# Patient Record
Sex: FEMALE | Race: WHITE | NOT HISPANIC OR LATINO | Employment: FULL TIME | URBAN - NONMETROPOLITAN AREA
[De-identification: names, ages, dates, MRNs, and addresses within clinical notes are randomized per-mention and may not be internally consistent; named-entity substitution may affect disease eponyms.]

---

## 2017-08-22 ENCOUNTER — HOSPITAL ENCOUNTER (EMERGENCY)
Facility: HOSPITAL | Age: 47
Discharge: HOME | End: 2017-08-22
Attending: EMERGENCY MEDICINE | Admitting: EMERGENCY MEDICINE

## 2017-08-22 ENCOUNTER — APPOINTMENT (OUTPATIENT)
Dept: GENERAL RADIOLOGY | Facility: HOSPITAL | Age: 47
End: 2017-08-22

## 2017-08-22 VITALS
HEIGHT: 67 IN | TEMPERATURE: 98.2 F | BODY MASS INDEX: 31.39 KG/M2 | OXYGEN SATURATION: 95 % | SYSTOLIC BLOOD PRESSURE: 134 MMHG | RESPIRATION RATE: 20 BRPM | HEART RATE: 81 BPM | WEIGHT: 200 LBS | DIASTOLIC BLOOD PRESSURE: 83 MMHG

## 2017-08-22 DIAGNOSIS — R07.9 CHEST PAIN, UNSPECIFIED TYPE: Primary | ICD-10-CM

## 2017-08-22 LAB
ALBUMIN SERPL-MCNC: 4.4 G/DL (ref 3.5–5)
ALBUMIN/GLOB SERPL: 1.5 G/DL (ref 1–2)
ALP SERPL-CCNC: 95 U/L (ref 38–126)
ALT SERPL W P-5'-P-CCNC: 27 U/L (ref 13–69)
ANION GAP SERPL CALCULATED.3IONS-SCNC: 14.8 MMOL/L
AST SERPL-CCNC: 34 U/L (ref 15–46)
BASOPHILS # BLD AUTO: 0.06 10*3/MM3 (ref 0–0.2)
BASOPHILS NFR BLD AUTO: 0.5 % (ref 0–2.5)
BILIRUB SERPL-MCNC: 0.5 MG/DL (ref 0.2–1.3)
BUN BLD-MCNC: 14 MG/DL (ref 7–20)
BUN/CREAT SERPL: 17.5 (ref 7.1–23.5)
CALCIUM SPEC-SCNC: 9 MG/DL (ref 8.4–10.2)
CHLORIDE SERPL-SCNC: 106 MMOL/L (ref 98–107)
CO2 SERPL-SCNC: 24 MMOL/L (ref 26–30)
CREAT BLD-MCNC: 0.8 MG/DL (ref 0.6–1.3)
DEPRECATED RDW RBC AUTO: 43 FL (ref 37–54)
EOSINOPHIL # BLD AUTO: 0.13 10*3/MM3 (ref 0–0.7)
EOSINOPHIL NFR BLD AUTO: 1.1 % (ref 0–7)
ERYTHROCYTE [DISTWIDTH] IN BLOOD BY AUTOMATED COUNT: 13.3 % (ref 11.5–14.5)
GFR SERPL CREATININE-BSD FRML MDRD: 77 ML/MIN/1.73
GLOBULIN UR ELPH-MCNC: 2.9 GM/DL
GLUCOSE BLD-MCNC: 93 MG/DL (ref 74–98)
HCT VFR BLD AUTO: 42.1 % (ref 37–47)
HGB BLD-MCNC: 13.8 G/DL (ref 12–16)
HOLD SPECIMEN: NORMAL
HOLD SPECIMEN: NORMAL
IMM GRANULOCYTES # BLD: 0.07 10*3/MM3 (ref 0–0.06)
IMM GRANULOCYTES NFR BLD: 0.6 % (ref 0–0.6)
LYMPHOCYTES # BLD AUTO: 3.83 10*3/MM3 (ref 0.6–3.4)
LYMPHOCYTES NFR BLD AUTO: 33 % (ref 10–50)
MCH RBC QN AUTO: 28.7 PG (ref 27–31)
MCHC RBC AUTO-ENTMCNC: 32.8 G/DL (ref 30–37)
MCV RBC AUTO: 87.5 FL (ref 81–99)
MONOCYTES # BLD AUTO: 0.7 10*3/MM3 (ref 0–0.9)
MONOCYTES NFR BLD AUTO: 6 % (ref 0–12)
NEUTROPHILS # BLD AUTO: 6.8 10*3/MM3 (ref 2–6.9)
NEUTROPHILS NFR BLD AUTO: 58.8 % (ref 37–80)
NRBC BLD MANUAL-RTO: 0 /100 WBC (ref 0–0)
PLATELET # BLD AUTO: 272 10*3/MM3 (ref 130–400)
PMV BLD AUTO: 10.3 FL (ref 6–12)
POTASSIUM BLD-SCNC: 3.8 MMOL/L (ref 3.5–5.1)
PROT SERPL-MCNC: 7.3 G/DL (ref 6.3–8.2)
RBC # BLD AUTO: 4.81 10*6/MM3 (ref 4.2–5.4)
SODIUM BLD-SCNC: 141 MMOL/L (ref 137–145)
TROPONIN I SERPL-MCNC: 0 NG/ML (ref 0–0.05)
TROPONIN I SERPL-MCNC: <0.012 NG/ML (ref 0–0.03)
WBC NRBC COR # BLD: 11.59 10*3/MM3 (ref 4.8–10.8)
WHOLE BLOOD HOLD SPECIMEN: NORMAL
WHOLE BLOOD HOLD SPECIMEN: NORMAL

## 2017-08-22 PROCEDURE — 80053 COMPREHEN METABOLIC PANEL: CPT

## 2017-08-22 PROCEDURE — 85025 COMPLETE CBC W/AUTO DIFF WBC: CPT

## 2017-08-22 PROCEDURE — 25010000002 KETOROLAC TROMETHAMINE PER 15 MG: Performed by: PHYSICIAN ASSISTANT

## 2017-08-22 PROCEDURE — 84484 ASSAY OF TROPONIN QUANT: CPT

## 2017-08-22 PROCEDURE — 71010 HC CHEST PA OR AP: CPT

## 2017-08-22 PROCEDURE — 96374 THER/PROPH/DIAG INJ IV PUSH: CPT

## 2017-08-22 PROCEDURE — 99284 EMERGENCY DEPT VISIT MOD MDM: CPT

## 2017-08-22 PROCEDURE — 93005 ELECTROCARDIOGRAM TRACING: CPT

## 2017-08-22 RX ORDER — DULOXETIN HYDROCHLORIDE 30 MG/1
30 CAPSULE, DELAYED RELEASE ORAL DAILY
COMMUNITY

## 2017-08-22 RX ORDER — KETOROLAC TROMETHAMINE 30 MG/ML
30 INJECTION, SOLUTION INTRAMUSCULAR; INTRAVENOUS ONCE
Status: COMPLETED | OUTPATIENT
Start: 2017-08-22 | End: 2017-08-22

## 2017-08-22 RX ORDER — SODIUM CHLORIDE 0.9 % (FLUSH) 0.9 %
10 SYRINGE (ML) INJECTION AS NEEDED
Status: DISCONTINUED | OUTPATIENT
Start: 2017-08-22 | End: 2017-08-22 | Stop reason: HOSPADM

## 2017-08-22 RX ORDER — ESTRADIOL 2 MG/1
2 TABLET ORAL DAILY
COMMUNITY

## 2017-08-22 RX ORDER — ROSUVASTATIN CALCIUM 20 MG/1
20 TABLET, COATED ORAL DAILY
COMMUNITY

## 2017-08-22 RX ADMIN — KETOROLAC TROMETHAMINE 30 MG: 30 INJECTION, SOLUTION INTRAMUSCULAR at 14:17

## 2017-08-22 NOTE — ED PROVIDER NOTES
Subjective   HPI Comments: 47-year-old female with intermittent left-sided chest pain for 1 week, she describes the pain as aching sharp pain that radiates down her left arm.  She's had the pain intermittent for 1 week.  She also states that she had this in the past and had a stress test several years ago but she does not know how long.  She was seen by her primary care physician today and sent to the emergency department for chest pain.  She has a history of cholesterol and she is a smoker she is not diabetic.  The pain is nonexertional.  sHe states she's had the pain today since this morning    Patient is a 47 y.o. female presenting with chest pain.   History provided by:  Patient   used: No    Chest Pain   Pain location:  L chest  Pain quality: aching    Pain radiates to:  L arm  Pain severity:  Mild  Onset quality:  Gradual  Duration:  1 week  Timing:  Intermittent  Progression:  Waxing and waning  Chronicity:  Recurrent  Relieved by:  Nothing  Worsened by:  Nothing  Associated symptoms comment:  Left arm pain      Review of Systems   Cardiovascular: Positive for chest pain.   All other systems reviewed and are negative.      Past Medical History:   Diagnosis Date   • Basal cell carcinoma    • Fibromyalgia    • Hyperlipidemia        Allergies   Allergen Reactions   • Penicillins Itching       Past Surgical History:   Procedure Laterality Date   • CHOLECYSTECTOMY     • FRACTURE SURGERY     • HYSTERECTOMY         History reviewed. No pertinent family history.    Social History     Social History   • Marital status: Single     Spouse name: N/A   • Number of children: N/A   • Years of education: N/A     Social History Main Topics   • Smoking status: Current Every Day Smoker     Packs/day: 1.00     Types: Cigarettes   • Smokeless tobacco: None   • Alcohol use No   • Drug use: No   • Sexual activity: Not Asked     Other Topics Concern   • None     Social History Narrative   • None            Objective   Physical Exam   Constitutional: She is oriented to person, place, and time. She appears well-developed and well-nourished.   HENT:   Head: Normocephalic.   Eyes: EOM are normal. Pupils are equal, round, and reactive to light.   Neck: Normal range of motion. Neck supple.   Cardiovascular: Normal rate and regular rhythm.    Pulmonary/Chest: Effort normal and breath sounds normal.   Abdominal: Soft. Bowel sounds are normal.   Musculoskeletal: Normal range of motion.   Neurological: She is alert and oriented to person, place, and time. She has normal reflexes.   Skin: Skin is warm and dry.   Psychiatric: She has a normal mood and affect.   Nursing note and vitals reviewed.      Procedures         ED Course  ED Course   Comment By Time   Discussed with Dr. Naylor, patient will follow-up as an outpatient and will call the office for a visit Peewee Pena Jr., PA-C 08/22 1432                  [facility]    Final diagnoses:   Chest pain, unspecified type            Peewee Pena Jr., PA-C  08/22/17 [id number]

## 2020-01-23 ENCOUNTER — OFFICE VISIT (OUTPATIENT)
Dept: UROLOGY | Facility: CLINIC | Age: 50
End: 2020-01-23

## 2020-01-23 VITALS
SYSTOLIC BLOOD PRESSURE: 142 MMHG | HEART RATE: 89 BPM | BODY MASS INDEX: 31.39 KG/M2 | OXYGEN SATURATION: 98 % | RESPIRATION RATE: 18 BRPM | TEMPERATURE: 98 F | WEIGHT: 200 LBS | HEIGHT: 67 IN | DIASTOLIC BLOOD PRESSURE: 90 MMHG

## 2020-01-23 DIAGNOSIS — N39.46 MIXED STRESS AND URGE URINARY INCONTINENCE: Primary | ICD-10-CM

## 2020-01-23 LAB
BILIRUB BLD-MCNC: NEGATIVE MG/DL
CLARITY, POC: CLEAR
COLOR UR: YELLOW
GLUCOSE UR STRIP-MCNC: NEGATIVE MG/DL
KETONES UR QL: NEGATIVE
LEUKOCYTE EST, POC: NEGATIVE
NITRITE UR-MCNC: NEGATIVE MG/ML
PH UR: 6 [PH] (ref 5–8)
PROT UR STRIP-MCNC: NEGATIVE MG/DL
RBC # UR STRIP: NEGATIVE /UL
SP GR UR: 1.02 (ref 1–1.03)
UROBILINOGEN UR QL: NORMAL

## 2020-01-23 PROCEDURE — 81003 URINALYSIS AUTO W/O SCOPE: CPT | Performed by: UROLOGY

## 2020-01-23 PROCEDURE — 51798 US URINE CAPACITY MEASURE: CPT | Performed by: UROLOGY

## 2020-01-23 PROCEDURE — 99204 OFFICE O/P NEW MOD 45 MIN: CPT | Performed by: UROLOGY

## 2020-01-23 RX ORDER — METOPROLOL SUCCINATE 50 MG/1
TABLET, EXTENDED RELEASE ORAL
COMMUNITY
Start: 2020-01-06

## 2020-01-23 RX ORDER — TOPIRAMATE 25 MG/1
TABLET ORAL
COMMUNITY
Start: 2019-12-27

## 2020-01-23 RX ORDER — TOLTERODINE 4 MG/1
CAPSULE, EXTENDED RELEASE ORAL
COMMUNITY
Start: 2019-12-27

## 2020-01-23 RX ORDER — OMEPRAZOLE 20 MG/1
CAPSULE, DELAYED RELEASE ORAL
COMMUNITY
Start: 2020-01-21

## 2020-01-23 RX ORDER — LORATADINE 10 MG/1
TABLET ORAL
COMMUNITY
Start: 2019-12-27

## 2022-06-16 ENCOUNTER — HOSPITAL ENCOUNTER (EMERGENCY)
Facility: HOSPITAL | Age: 52
Discharge: HOME | End: 2022-06-16
Attending: EMERGENCY MEDICINE | Admitting: EMERGENCY MEDICINE

## 2022-06-16 ENCOUNTER — APPOINTMENT (OUTPATIENT)
Dept: CT IMAGING | Facility: HOSPITAL | Age: 52
End: 2022-06-16

## 2022-06-16 ENCOUNTER — APPOINTMENT (OUTPATIENT)
Dept: GENERAL RADIOLOGY | Facility: HOSPITAL | Age: 52
End: 2022-06-16

## 2022-06-16 VITALS
BODY MASS INDEX: 32.49 KG/M2 | SYSTOLIC BLOOD PRESSURE: 120 MMHG | WEIGHT: 207 LBS | RESPIRATION RATE: 16 BRPM | HEART RATE: 81 BPM | DIASTOLIC BLOOD PRESSURE: 59 MMHG | OXYGEN SATURATION: 97 % | TEMPERATURE: 98.2 F | HEIGHT: 67 IN

## 2022-06-16 DIAGNOSIS — K52.9 ENTEROCOLITIS: Primary | ICD-10-CM

## 2022-06-16 DIAGNOSIS — R11.2 NAUSEA AND VOMITING IN ADULT: ICD-10-CM

## 2022-06-16 DIAGNOSIS — R00.2 PALPITATIONS: ICD-10-CM

## 2022-06-16 LAB
ALBUMIN SERPL-MCNC: 4 G/DL (ref 3.5–5.2)
ALBUMIN/GLOB SERPL: 1.4 G/DL
ALP SERPL-CCNC: 84 U/L (ref 39–117)
ALT SERPL W P-5'-P-CCNC: 8 U/L (ref 1–33)
AMYLASE SERPL-CCNC: 38 U/L (ref 28–100)
ANION GAP SERPL CALCULATED.3IONS-SCNC: 15.8 MMOL/L (ref 5–15)
AST SERPL-CCNC: 23 U/L (ref 1–32)
BASOPHILS # BLD AUTO: 0.04 10*3/MM3 (ref 0–0.2)
BASOPHILS NFR BLD AUTO: 0.4 % (ref 0–1.5)
BILIRUB SERPL-MCNC: 0.4 MG/DL (ref 0–1.2)
BUN SERPL-MCNC: 12 MG/DL (ref 6–20)
BUN/CREAT SERPL: 16 (ref 7–25)
CALCIUM SPEC-SCNC: 8.7 MG/DL (ref 8.6–10.5)
CHLORIDE SERPL-SCNC: 103 MMOL/L (ref 98–107)
CO2 SERPL-SCNC: 19.2 MMOL/L (ref 22–29)
CREAT SERPL-MCNC: 0.75 MG/DL (ref 0.57–1)
DEPRECATED RDW RBC AUTO: 40.6 FL (ref 37–54)
EGFRCR SERPLBLD CKD-EPI 2021: 96.5 ML/MIN/1.73
EOSINOPHIL # BLD AUTO: 0.02 10*3/MM3 (ref 0–0.4)
EOSINOPHIL NFR BLD AUTO: 0.2 % (ref 0.3–6.2)
ERYTHROCYTE [DISTWIDTH] IN BLOOD BY AUTOMATED COUNT: 12.8 % (ref 12.3–15.4)
GLOBULIN UR ELPH-MCNC: 2.8 GM/DL
GLUCOSE SERPL-MCNC: 99 MG/DL (ref 65–99)
HCT VFR BLD AUTO: 43.4 % (ref 34–46.6)
HGB BLD-MCNC: 14.5 G/DL (ref 12–15.9)
HOLD SPECIMEN: NORMAL
HOLD SPECIMEN: NORMAL
IMM GRANULOCYTES # BLD AUTO: 0.04 10*3/MM3 (ref 0–0.05)
IMM GRANULOCYTES NFR BLD AUTO: 0.4 % (ref 0–0.5)
LIPASE SERPL-CCNC: 26 U/L (ref 13–60)
LYMPHOCYTES # BLD AUTO: 1.3 10*3/MM3 (ref 0.7–3.1)
LYMPHOCYTES NFR BLD AUTO: 14.4 % (ref 19.6–45.3)
MCH RBC QN AUTO: 29.2 PG (ref 26.6–33)
MCHC RBC AUTO-ENTMCNC: 33.4 G/DL (ref 31.5–35.7)
MCV RBC AUTO: 87.5 FL (ref 79–97)
MONOCYTES # BLD AUTO: 0.58 10*3/MM3 (ref 0.1–0.9)
MONOCYTES NFR BLD AUTO: 6.4 % (ref 5–12)
NEUTROPHILS NFR BLD AUTO: 7.04 10*3/MM3 (ref 1.7–7)
NEUTROPHILS NFR BLD AUTO: 78.2 % (ref 42.7–76)
NRBC BLD AUTO-RTO: 0 /100 WBC (ref 0–0.2)
PLATELET # BLD AUTO: 233 10*3/MM3 (ref 140–450)
PMV BLD AUTO: 10.3 FL (ref 6–12)
POTASSIUM SERPL-SCNC: 3.7 MMOL/L (ref 3.5–5.2)
PROT SERPL-MCNC: 6.8 G/DL (ref 6–8.5)
RBC # BLD AUTO: 4.96 10*6/MM3 (ref 3.77–5.28)
SODIUM SERPL-SCNC: 138 MMOL/L (ref 136–145)
WBC NRBC COR # BLD: 9.02 10*3/MM3 (ref 3.4–10.8)
WHOLE BLOOD HOLD COAG: NORMAL
WHOLE BLOOD HOLD SPECIMEN: NORMAL

## 2022-06-16 PROCEDURE — 85025 COMPLETE CBC W/AUTO DIFF WBC: CPT | Performed by: EMERGENCY MEDICINE

## 2022-06-16 PROCEDURE — 80053 COMPREHEN METABOLIC PANEL: CPT | Performed by: EMERGENCY MEDICINE

## 2022-06-16 PROCEDURE — 83690 ASSAY OF LIPASE: CPT | Performed by: EMERGENCY MEDICINE

## 2022-06-16 PROCEDURE — 93005 ELECTROCARDIOGRAM TRACING: CPT | Performed by: EMERGENCY MEDICINE

## 2022-06-16 PROCEDURE — 82150 ASSAY OF AMYLASE: CPT | Performed by: EMERGENCY MEDICINE

## 2022-06-16 PROCEDURE — 71045 X-RAY EXAM CHEST 1 VIEW: CPT

## 2022-06-16 PROCEDURE — 99283 EMERGENCY DEPT VISIT LOW MDM: CPT

## 2022-06-16 PROCEDURE — 25010000002 IOPAMIDOL 61 % SOLUTION: Performed by: EMERGENCY MEDICINE

## 2022-06-16 PROCEDURE — 74177 CT ABD & PELVIS W/CONTRAST: CPT

## 2022-06-16 RX ORDER — DICYCLOMINE HCL 20 MG
20 TABLET ORAL EVERY 6 HOURS PRN
Qty: 20 TABLET | Refills: 0 | Status: SHIPPED | OUTPATIENT
Start: 2022-06-16

## 2022-06-16 RX ORDER — SODIUM CHLORIDE 0.9 % (FLUSH) 0.9 %
10 SYRINGE (ML) INJECTION AS NEEDED
Status: DISCONTINUED | OUTPATIENT
Start: 2022-06-16 | End: 2022-06-16 | Stop reason: HOSPADM

## 2022-06-16 RX ORDER — ONDANSETRON 4 MG/1
4 TABLET, ORALLY DISINTEGRATING ORAL EVERY 6 HOURS PRN
Qty: 12 TABLET | Refills: 0 | Status: SHIPPED | OUTPATIENT
Start: 2022-06-16

## 2022-06-16 RX ADMIN — IOPAMIDOL 100 ML: 612 INJECTION, SOLUTION INTRAVENOUS at 14:18

## 2022-06-16 NOTE — ED PROVIDER NOTES
Subjective   This is a 52-year-old female who presents to the emergency department chief complaint nausea, vomiting for the past 2 days.  Patient has had lower abdominal and back pain.  Patient describes cramping, stabbing pain.  Patient has had intermittent palpitations.  Patient states she is had too numerous to count bilious emesis.  Patient denies any other associated complaints.      History provided by:  Patient   used: No    Vomiting  The primary symptoms include abdominal pain, nausea and vomiting. Primary symptoms do not include dysuria. The illness began today. The onset was sudden.   The illness is also significant for chills. The illness does not include odynophagia, bloating, constipation, tenesmus or back pain. Associated medical issues do not include GERD, gallstones, liver disease, gastric bypass, bowel resection, irritable bowel syndrome or hemorrhoids. Risk factors for a gastrointestinal illness include travel to endemic areas.       Review of Systems   Constitutional: Positive for chills.   HENT: Negative.  Negative for ear pain, facial swelling, mouth sores and nosebleeds.    Eyes: Negative.  Negative for pain, discharge, redness and itching.   Respiratory: Negative.  Negative for cough, choking, chest tightness and shortness of breath.    Cardiovascular: Negative.  Negative for chest pain and leg swelling.   Gastrointestinal: Positive for abdominal distention, abdominal pain, nausea and vomiting. Negative for bloating and constipation.   Endocrine: Negative.  Negative for heat intolerance, polydipsia and polyphagia.   Genitourinary: Negative.  Negative for dysuria, enuresis, flank pain, frequency, genital sores, hematuria, menstrual problem and pelvic pain.   Musculoskeletal: Negative.  Negative for back pain, gait problem and joint swelling.   Skin: Negative.  Negative for color change, pallor and wound.   Neurological: Negative.  Negative for seizures, speech difficulty,  light-headedness and headaches.   Psychiatric/Behavioral: Negative.  Negative for agitation, behavioral problems, confusion, decreased concentration, dysphoric mood and hallucinations.   All other systems reviewed and are negative.      Past Medical History:   Diagnosis Date   • Basal cell carcinoma    • Fibromyalgia    • Hyperlipidemia        Allergies   Allergen Reactions   • Penicillins Itching       Past Surgical History:   Procedure Laterality Date   • CHOLECYSTECTOMY     • FRACTURE SURGERY     • HYSTERECTOMY         Family History   Problem Relation Age of Onset   • Bleeding Disorder Father    • Hypertension Father    • Arthritis Mother    • Kidney disease Brother        Social History     Socioeconomic History   • Marital status: Single   Tobacco Use   • Smoking status: Current Every Day Smoker     Packs/day: 1.00     Types: Cigarettes   • Smokeless tobacco: Never Used   Substance and Sexual Activity   • Alcohol use: No   • Drug use: No           Objective   Physical Exam  Vitals and nursing note reviewed.   Constitutional:       General: She is not in acute distress.     Appearance: Normal appearance. She is well-developed and normal weight. She is not ill-appearing or toxic-appearing.   HENT:      Head: Normocephalic and atraumatic.      Right Ear: Tympanic membrane, ear canal and external ear normal. There is no impacted cerumen.      Left Ear: Tympanic membrane, ear canal and external ear normal. There is impacted cerumen.      Nose: Nose normal. No congestion or rhinorrhea.      Mouth/Throat:      Mouth: Mucous membranes are moist.      Pharynx: Oropharynx is clear. No oropharyngeal exudate or posterior oropharyngeal erythema.   Eyes:      General: No scleral icterus.        Right eye: No discharge.         Left eye: No discharge.      Conjunctiva/sclera: Conjunctivae normal.      Pupils: Pupils are equal, round, and reactive to light.   Cardiovascular:      Rate and Rhythm: Normal rate and regular  rhythm.      Pulses: Normal pulses.      Heart sounds: Normal heart sounds. No murmur heard.    No friction rub. No gallop.   Pulmonary:      Effort: Pulmonary effort is normal. No respiratory distress.      Breath sounds: Normal breath sounds. No stridor. No wheezing, rhonchi or rales.   Chest:      Chest wall: No tenderness.   Abdominal:      General: Bowel sounds are normal.      Palpations: Abdomen is soft.      Tenderness: There is generalized abdominal tenderness. There is no left CVA tenderness or rebound.      Hernia: No hernia is present.   Musculoskeletal:         General: No swelling, tenderness, deformity or signs of injury. Normal range of motion.      Cervical back: Normal range of motion and neck supple. No rigidity or tenderness.      Right lower leg: No edema.      Left lower leg: No edema.   Lymphadenopathy:      Cervical: No cervical adenopathy.   Skin:     General: Skin is warm and dry.      Capillary Refill: Capillary refill takes less than 2 seconds.      Coloration: Skin is not jaundiced or pale.      Findings: No bruising, erythema, lesion or rash.   Neurological:      General: No focal deficit present.      Mental Status: She is alert and oriented to person, place, and time.      Cranial Nerves: No cranial nerve deficit.      Sensory: No sensory deficit.      Motor: No weakness.      Coordination: Coordination normal.      Gait: Gait normal.      Deep Tendon Reflexes: Reflexes normal.   Psychiatric:         Behavior: Behavior normal.         Thought Content: Thought content normal.         Judgment: Judgment normal.         Procedures           ED Course  ED Course as of 06/16/22 1605   Thu Jun 16, 2022   1150 EKG interpreted by me: Sinus rhythm, normal rate, some nonspecific ST/T changes, this is an abnormal EKG [MP]   1353 IMPRESSION:  No acute cardiopulmonary findings.     Images personally reviewed, interpreted and dictated by FRIEDA Scott.    []   3577 CT Abdomen Pelvis With  Contrast []   1559    IMPRESSION:  No definite acute findings in the abdomen or pelvis to account for the  patient's symptoms.      Fluid-filled nondistended small and large bowel loops without evidence  of obstruction, correlate for possible enterocolitis.     Mild hepatic steatosis. Prior cholecystectomy. Normal appendix.     Images personally reviewed, interpreted and dictated by FRIEDA Scott. []      ED Course User Index  [] Alfonzo Leonard PA-C  [MP] Pj Neff MD                                                 [facility]    Final diagnoses:   Enterocolitis   Nausea and vomiting in adult   Palpitations       ED Disposition  ED Disposition     ED Disposition   Discharge    Condition   Stable    Comment   --             Latrice Tripp APRN  [address]  [place] [id number] [phone]    Call in 1 day           Medication List      New Prescriptions    dicyclomine 20 MG tablet  Commonly known as: BENTYL  Take 1 tablet by mouth Every 6 (Six) Hours As Needed (abdominal pain).     ondansetron ODT 4 MG disintegrating tablet  Commonly known as: ZOFRAN-ODT  Place 1 tablet on the tongue Every 6 (Six) Hours As Needed for Nausea or Vomiting.           Where to Get Your Medications      These medications were sent to [id number] - [address] AT [facility] - [phone]  - [phone] [address] [address]    Phone: [phone]   · dicyclomine 20 MG tablet  · ondansetron ODT 4 MG disintegrating tablet          Alfonzo Leonrad PA-C  06/16/22 3960

## 2022-09-24 ENCOUNTER — TELEPHONE (OUTPATIENT)
Dept: SURGERY | Facility: CLINIC | Age: 52
End: 2022-09-24

## 2022-10-03 RX ORDER — BISACODYL 5 MG
TABLET, DELAYED RELEASE (ENTERIC COATED) ORAL
Qty: 4 TABLET | Refills: 1 | Status: SHIPPED | OUTPATIENT
Start: 2022-10-03 | End: 2022-11-04 | Stop reason: HOSPADM

## 2022-10-03 RX ORDER — POLYETHYLENE GLYCOL 3350 17 G/17G
238 POWDER, FOR SOLUTION ORAL ONCE
Qty: 14 PACKET | Refills: 0 | Status: SHIPPED | OUTPATIENT
Start: 2022-10-03 | End: 2022-10-03

## 2022-10-03 NOTE — TELEPHONE ENCOUNTER
PRESCREENING FOR OPEN ACCESS SCHEDULING    Cecelia Rouse, 1970  [id number]    10/03/22    If, the patient answers yes to any of the following questions the provider will be informed prior to scheduling open access for approval and documented in the chart.    []  Yes  [x] No    1. Have you ever had a colonoscopy in the past?      When:        Where:       Polyps or other:     []  Yes  [x] No    2. Family history of colon cancer?      Relation:       Age of onset:       Do you currently have any of the following?    []  Yes  [x] No  Rectal bleeding, if so, how long?     [x]  Yes  [x] No  Abdominal pain, if so, how long?    [x]  Yes  [x] No  Constipation, if so, how long?    []  Yes  [x] No  Diarrhea, if so, how long?    []  Yes  [x] No  Weight loss, is so, how much?    [] Yes  [x] No  Small caliber stool, if so, how long?      Have you ever had any of the following conditions?    [] Yes  [x] No  Heart attack?      When?       Last cardiac workup?     Blood thinners?    [] Yes  [x] No   Lung problems, asthma or COPD?  [] Yes  [x] No  Oxygen required?       [] Yes  [x] No  Stroke?     [] Yes  [x] No  Have you ever had a reaction to anesthesia?         Patient scheduled with Dr. Becker, 11/4/22.

## 2022-10-17 ENCOUNTER — PREP FOR SURGERY (OUTPATIENT)
Dept: OTHER | Facility: HOSPITAL | Age: 52
End: 2022-10-17

## 2022-10-17 DIAGNOSIS — Z12.11 COLON CANCER SCREENING: Primary | ICD-10-CM

## 2022-10-17 RX ORDER — SODIUM CHLORIDE 0.9 % (FLUSH) 0.9 %
10 SYRINGE (ML) INJECTION AS NEEDED
Status: CANCELLED | OUTPATIENT
Start: 2022-10-17

## 2022-10-17 RX ORDER — SODIUM CHLORIDE, SODIUM LACTATE, POTASSIUM CHLORIDE, CALCIUM CHLORIDE 600; 310; 30; 20 MG/100ML; MG/100ML; MG/100ML; MG/100ML
50 INJECTION, SOLUTION INTRAVENOUS CONTINUOUS
Status: CANCELLED | OUTPATIENT
Start: 2022-10-17

## 2022-10-17 RX ORDER — SODIUM CHLORIDE 0.9 % (FLUSH) 0.9 %
10 SYRINGE (ML) INJECTION EVERY 12 HOURS SCHEDULED
Status: CANCELLED | OUTPATIENT
Start: 2022-10-17

## 2022-10-18 PROBLEM — Z12.11 COLON CANCER SCREENING: Status: ACTIVE | Noted: 2022-10-18

## 2022-11-01 ENCOUNTER — TELEPHONE (OUTPATIENT)
Dept: SURGERY | Facility: CLINIC | Age: 52
End: 2022-11-01

## 2022-11-03 RX ORDER — ASPIRIN 81 MG/1
81 TABLET, CHEWABLE ORAL DAILY
COMMUNITY

## 2022-11-04 ENCOUNTER — HOSPITAL ENCOUNTER (OUTPATIENT)
Facility: HOSPITAL | Age: 52
Setting detail: HOSPITAL OUTPATIENT SURGERY
Discharge: HOME | End: 2022-11-04
Attending: SURGERY | Admitting: SURGERY
Payer: COMMERCIAL

## 2022-11-04 ENCOUNTER — ANESTHESIA EVENT (OUTPATIENT)
Dept: GASTROENTEROLOGY | Facility: HOSPITAL | Age: 52
End: 2022-11-04
Payer: COMMERCIAL

## 2022-11-04 ENCOUNTER — ANESTHESIA (OUTPATIENT)
Dept: GASTROENTEROLOGY | Facility: HOSPITAL | Age: 52
End: 2022-11-04
Payer: COMMERCIAL

## 2022-11-04 VITALS
RESPIRATION RATE: 16 BRPM | BODY MASS INDEX: 32.65 KG/M2 | TEMPERATURE: 97.6 F | OXYGEN SATURATION: 94 % | HEIGHT: 67 IN | WEIGHT: 208 LBS | DIASTOLIC BLOOD PRESSURE: 82 MMHG | HEART RATE: 82 BPM | SYSTOLIC BLOOD PRESSURE: 134 MMHG

## 2022-11-04 DIAGNOSIS — Z12.11 COLON CANCER SCREENING: ICD-10-CM

## 2022-11-04 LAB — GLUCOSE BLDC GLUCOMTR-MCNC: 105 MG/DL (ref 70–130)

## 2022-11-04 PROCEDURE — 82962 GLUCOSE BLOOD TEST: CPT

## 2022-11-04 PROCEDURE — 25010000002 PROPOFOL 200 MG/20ML EMULSION: Performed by: NURSE ANESTHETIST, CERTIFIED REGISTERED

## 2022-11-04 PROCEDURE — 25010000002 ONDANSETRON PER 1 MG: Performed by: NURSE ANESTHETIST, CERTIFIED REGISTERED

## 2022-11-04 PROCEDURE — S0260 H&P FOR SURGERY: HCPCS | Performed by: SURGERY

## 2022-11-04 PROCEDURE — 45378 DIAGNOSTIC COLONOSCOPY: CPT | Performed by: SURGERY

## 2022-11-04 RX ORDER — SODIUM CHLORIDE 0.9 % (FLUSH) 0.9 %
10 SYRINGE (ML) INJECTION AS NEEDED
Status: DISCONTINUED | OUTPATIENT
Start: 2022-11-04 | End: 2022-11-04 | Stop reason: HOSPADM

## 2022-11-04 RX ORDER — ONDANSETRON 2 MG/ML
INJECTION INTRAMUSCULAR; INTRAVENOUS AS NEEDED
Status: DISCONTINUED | OUTPATIENT
Start: 2022-11-04 | End: 2022-11-04 | Stop reason: SURG

## 2022-11-04 RX ORDER — SODIUM CHLORIDE, SODIUM LACTATE, POTASSIUM CHLORIDE, CALCIUM CHLORIDE 600; 310; 30; 20 MG/100ML; MG/100ML; MG/100ML; MG/100ML
50 INJECTION, SOLUTION INTRAVENOUS CONTINUOUS
Status: DISCONTINUED | OUTPATIENT
Start: 2022-11-04 | End: 2022-11-04 | Stop reason: HOSPADM

## 2022-11-04 RX ORDER — SODIUM CHLORIDE 0.9 % (FLUSH) 0.9 %
10 SYRINGE (ML) INJECTION EVERY 12 HOURS SCHEDULED
Status: DISCONTINUED | OUTPATIENT
Start: 2022-11-04 | End: 2022-11-04 | Stop reason: HOSPADM

## 2022-11-04 RX ORDER — PROPOFOL 10 MG/ML
INJECTION, EMULSION INTRAVENOUS AS NEEDED
Status: DISCONTINUED | OUTPATIENT
Start: 2022-11-04 | End: 2022-11-04 | Stop reason: SURG

## 2022-11-04 RX ORDER — LIDOCAINE HYDROCHLORIDE 20 MG/ML
INJECTION, SOLUTION INTRAVENOUS AS NEEDED
Status: DISCONTINUED | OUTPATIENT
Start: 2022-11-04 | End: 2022-11-04 | Stop reason: SURG

## 2022-11-04 RX ADMIN — PROPOFOL 50 MG: 10 INJECTION, EMULSION INTRAVENOUS at 09:42

## 2022-11-04 RX ADMIN — LIDOCAINE HYDROCHLORIDE 50 MG: 20 INJECTION, SOLUTION INTRAVENOUS at 09:29

## 2022-11-04 RX ADMIN — PROPOFOL 100 MG: 10 INJECTION, EMULSION INTRAVENOUS at 09:29

## 2022-11-04 RX ADMIN — PROPOFOL 100 MG: 10 INJECTION, EMULSION INTRAVENOUS at 09:35

## 2022-11-04 RX ADMIN — SODIUM CHLORIDE, POTASSIUM CHLORIDE, SODIUM LACTATE AND CALCIUM CHLORIDE 50 ML/HR: 600; 310; 30; 20 INJECTION, SOLUTION INTRAVENOUS at 08:37

## 2022-11-04 RX ADMIN — GLYCOPYRROLATE 0.2 MG: 0.2 INJECTION, SOLUTION INTRAMUSCULAR; INTRAVITREAL at 09:26

## 2022-11-04 RX ADMIN — ONDANSETRON 4 MG: 2 INJECTION INTRAMUSCULAR; INTRAVENOUS at 09:49

## 2022-11-04 NOTE — ANESTHESIA PREPROCEDURE EVALUATION
Anesthesia Evaluation     Patient summary reviewed and Nursing notes reviewed   NPO Solid Status: > 8 hours  NPO Liquid Status: > 8 hours           Airway   Mallampati: II  TM distance: >3 FB  Neck ROM: full  Possible difficult intubation  Dental - normal exam     Pulmonary     breath sounds clear to auscultation  (+) a smoker Current,   Cardiovascular   Exercise tolerance: good (4-7 METS)    Patient on routine beta blocker  Rhythm: regular  Rate: normal    (+) hyperlipidemia,       Neuro/Psych- negative ROS  GI/Hepatic/Renal/Endo    (+) obesity,   diabetes mellitus type 2,     Musculoskeletal     (+) myalgias,   Abdominal    Substance History      OB/GYN          Other                        Anesthesia Plan    ASA 3     MAC     intravenous induction     Anesthetic plan, risks, benefits, and alternatives have been provided, discussed and informed consent has been obtained with: patient.  Pre-procedure education provided  Plan discussed with CRNA.        CODE STATUS:

## 2022-11-04 NOTE — ANESTHESIA POSTPROCEDURE EVALUATION
Patient: Cecelia Rouse    Procedure Summary     Date: 11/04/22 Room / Location: [facility] ENDOSCOPY 2 / [facility] ENDOSCOPY    Anesthesia Start: 0919 Anesthesia Stop: 0956    Procedure: COLONOSCOPY Diagnosis:       Colon cancer screening      (Colon cancer screening [Z12.11])    Surgeons: Cynthia Becker MD Provider: Amie Díaz CRNA    Anesthesia Type: MAC ASA Status: 3          Anesthesia Type: MAC    Vitals  Vitals Value Taken Time   /82 11/04/22 1030   Temp 97.6 °F (36.4 °C) 11/04/22 1000   Pulse 82 11/04/22 1030   Resp 16 11/04/22 1030   SpO2 94 % 11/04/22 1030           Post Anesthesia Care and Evaluation    Patient location during evaluation: PHASE II  Patient participation: complete - patient participated  Level of consciousness: awake and alert  Pain score: 0  Pain management: satisfactory to patient    Airway patency: patent  Anesthetic complications: No anesthetic complications  PONV Status: none  Cardiovascular status: acceptable and stable  Respiratory status: acceptable  Hydration status: acceptable    Comments: Vitals signs as noted in nursing documentation as per protocol.

## 2022-11-04 NOTE — H&P
General Surgery H&P    Name:Cecelia Rouse  Age: 52 y.o.  Gender: female  : 1970  MRN: [id number]  Visit Number: [id number]  Admit Date: 2022  Date of Service: 22    Patient Care Team:  Latrice Tripp APRN as PCP - General (Family Medicine)      Chief complaint: colon cancer screening      History of Present Illness:     Cecelia Rouse is a 52 y.o. female patient who presents for baseline screening colonoscopy. She denies problems related to her bowel habits.  She denies having a family history of colon cancer or IBD.        Patient Active Problem List   Diagnosis   • Colon cancer screening         Past Medical History:   Diagnosis Date   • Basal cell carcinoma    • Diabetes mellitus (HCC)    • Fibromyalgia    • Hyperlipidemia        Past Surgical History:   Procedure Laterality Date   • CHOLECYSTECTOMY     • FRACTURE SURGERY     • HYSTERECTOMY         Family History   Problem Relation Age of Onset   • Bleeding Disorder Father    • Hypertension Father    • Arthritis Mother    • Kidney disease Brother        Social History     Socioeconomic History   • Marital status: Single   Tobacco Use   • Smoking status: Every Day     Packs/day: 1.00     Types: Cigarettes   • Smokeless tobacco: Never   Vaping Use   • Vaping Use: Every day   Substance and Sexual Activity   • Alcohol use: No   • Drug use: No         Current Facility-Administered Medications:   •  lactated ringers infusion, 50 mL/hr, Intravenous, Continuous, Cynthia Becker MD, Last Rate: 50 mL/hr at 22 0837, 50 mL/hr at 22 0837  •  sodium chloride 0.9 % flush 10 mL, 10 mL, Intravenous, Q12H, Cynthia Becker MD  •  sodium chloride 0.9 % flush 10 mL, 10 mL, Intravenous, PRN, Cynthia Becker MD    Medications Prior to Admission   Medication Sig Dispense Refill Last Dose   • bisacodyl (Dulcolax) 5 MG EC tablet Take 4 dulcolax at 1pm 4 tablet 1 11/3/2022   • dicyclomine (BENTYL) 20 MG tablet Take 1 tablet by mouth Every 6 (Six)  Hours As Needed (abdominal pain). 20 tablet 0 11/3/2022   • DULoxetine (CYMBALTA) 30 MG capsule Take 30 mg by mouth Daily.   11/3/2022   • estradiol (ESTRACE) 2 MG tablet Take 2 mg by mouth Daily.   11/3/2022   • loratadine (CLARITIN) 10 MG tablet    11/3/2022   • metoprolol succinate XL (TOPROL-XL) 50 MG 24 hr tablet    11/4/2022 at 515   • omeprazole (priLOSEC) 20 MG capsule    11/3/2022   • ondansetron ODT (ZOFRAN-ODT) 4 MG disintegrating tablet Place 1 tablet on the tongue Every 6 (Six) Hours As Needed for Nausea or Vomiting. 12 tablet 0 Past Week   • rosuvastatin (CRESTOR) 20 MG tablet Take 20 mg by mouth Daily.   11/3/2022   • tolterodine LA (DETROL LA) 4 MG 24 hr capsule    11/3/2022   • topiramate (TOPAMAX) 25 MG tablet    11/3/2022   • aspirin 81 MG chewable tablet Chew 1 tablet Daily.   10/30/2022 at 0800   • Semaglutide,0.25 or 0.5MG/DOS, (OZEMPIC, 0.25 OR 0.5 MG/DOSE,) 2 MG/1.5ML solution pen-injector Inject  under the skin into the appropriate area as directed 1 (One) Time Per Week.          Allergies   Allergen Reactions   • Penicillins Itching       Review of Systems   Constitutional: Negative.    HENT: Negative.    Eyes: Negative.    Respiratory: Negative.    Cardiovascular: Negative.    Gastrointestinal: Negative.    Endocrine: Negative.    Genitourinary: Negative.    Musculoskeletal: Negative.    Skin: Negative.    Allergic/Immunologic: Negative.    Neurological: Negative.    Hematological: Negative.    Psychiatric/Behavioral: Negative.        OBJECTIVE:     Vital Signs  Temp:  [97.7 °F (36.5 °C)] 97.7 °F (36.5 °C)  Heart Rate:  [79] 79  Resp:  [18] 18  BP: (124)/(61) 124/61    No intake/output data recorded.  No intake/output data recorded.      Physical Exam:     General Appearance:    Alert, cooperative, in no acute distress   Head:    Normocephalic, without obvious abnormality, atraumatic   Eyes:            Lids and lashes normal, conjunctivae and sclerae normal, no   icterus, no pallor,  corneas clear, PERRLA   Ears:    Ears appear intact with no abnormalities noted   Lungs:     Clear to auscultation,respirations regular, even and                   unlabored    Heart:    Regular rhythm and normal rate, normal S1 and S2, no            murmur, no gallop, no rub, no click   Abdomen:     Normal bowel sounds, no masses, no organomegaly, soft        non-tender, non-distended, no guarding, no rebound                 tenderness   Extremities:   Moves all extremities well, no edema, no cyanosis, no              redness   Pulses:   Pulses palpable and equal bilaterally   Skin:   No bleeding, bruising or rash   Neurologic:   AAOx3       Results Review:  I have reviewed the entirety of the patient's clinical lab results.  I have also personally reviewed the patient's imaging      Lab Results (last 72 hours)     ** No results found for the last 72 hours. **                          ASSESSMENT/PLAN:      Colon cancer screening    We discussed colonoscopy for colon cancer screening purposes.  We discussed the indications for screening colonoscopy as well as the risks, benefits and alternatives to this procedure. Risks including but not limited to perforation, bleeding,need for blood transfusion or emergent surgery ,and missed neoplasm were reviewed in detail with the patient.  The patient was given an opportunity to ask questions.  The patient verbalized understanding of these recommendations and the plan of care. The patient is willing to proceed with colonoscopy and has signed informed consent.        Cynthia Becker MD  11/04/22  09:15 EDT

## 2022-11-04 NOTE — DISCHARGE INSTRUCTIONS
To assist you in voiding:  Drink plenty of fluids  Listen to running water while attempting to void.    If you are unable to urinate and you have an uncomfortable urge to void or it has been   6 hours since you were discharged, return to the Emergency Room Please follow all post op instructions and follow up appointment time from your physician's office included in your discharge packet.  .  Rest today  No pushing,pulling,tugging,heavy lifting, or strenuous activity   No major decision making,driving,or drinking alcoholic beverages for 24 hours due to the sedation you received  Always use good hand hygiene/washing technique  No driving on pain medication.

## (undated) DEVICE — MEDI-VAC NON-CONDUCTIVE SUCTION TUBING: Brand: CARDINAL HEALTH

## (undated) DEVICE — LUBE JELLY PK/2.75GM STRL BX/144

## (undated) DEVICE — VLV SXN AIR/H2O ORCAPOD3 1P/U STRL

## (undated) DEVICE — GLV SURG SENSICARE W/ALOE PF LF SZ6 STRL

## (undated) DEVICE — Device